# Patient Record
Sex: MALE | Race: WHITE | NOT HISPANIC OR LATINO | Employment: FULL TIME | URBAN - METROPOLITAN AREA
[De-identification: names, ages, dates, MRNs, and addresses within clinical notes are randomized per-mention and may not be internally consistent; named-entity substitution may affect disease eponyms.]

---

## 2023-11-17 NOTE — PROGRESS NOTES
Colorectal Surgery Established Patient Visit    History Of Present Illness  Ross Nova is a 68 y.o. male Ross Nova is a 67M who underwent a J to K POUCH August 2019. He last saw Dr. Ramos May 2022. Admitted for SBO 12/9/22 - 12/17/22. He was last evaluated by NOY Anguiano in January 2023, doing well with no issues intubating on low residual diet.          Past Medical History  He has no past medical history on file.    Surgical History  He has a past surgical history that includes Other surgical history (05/07/2019); Other surgical history (05/07/2019); and Other surgical history (05/12/2019).     Social History  He has no history on file for tobacco use, alcohol use, and drug use.    Family History  No family history on file.     Allergies  Patient has no allergy information on record.    Home Medications  Prior to Admission medications    Not on File         Review of Systems      Imaging:  No results found.       Labs:   Lab Results   Component Value Date    AST 12 12/12/2022    ALT 7 (L) 12/12/2022    ALKPHOS 41 12/12/2022    PROT 5.2 (L) 12/12/2022    ALBUMIN 3.3 (L) 12/15/2022          Physical Exam       Last Recorded Vitals  There were no vitals taken for this visit.      ASSESSMENT AND PLAN: ***        11/17/2023

## 2023-11-21 ENCOUNTER — TELEMEDICINE (OUTPATIENT)
Dept: SURGERY | Facility: CLINIC | Age: 68
End: 2023-11-21
Payer: MEDICARE

## 2024-05-07 ENCOUNTER — OFFICE VISIT (OUTPATIENT)
Dept: SURGERY | Facility: CLINIC | Age: 69
End: 2024-05-07
Payer: MEDICARE

## 2024-05-07 VITALS
HEIGHT: 74 IN | BODY MASS INDEX: 22.59 KG/M2 | DIASTOLIC BLOOD PRESSURE: 79 MMHG | WEIGHT: 176 LBS | HEART RATE: 51 BPM | SYSTOLIC BLOOD PRESSURE: 126 MMHG

## 2024-05-07 DIAGNOSIS — Z01.812 PRE-PROCEDURAL LABORATORY EXAMINATION: ICD-10-CM

## 2024-05-07 DIAGNOSIS — K43.9 VENTRAL HERNIA WITHOUT OBSTRUCTION OR GANGRENE: Primary | ICD-10-CM

## 2024-05-07 PROCEDURE — 99213 OFFICE O/P EST LOW 20 MIN: CPT | Performed by: COLON & RECTAL SURGERY

## 2024-05-07 PROCEDURE — 1159F MED LIST DOCD IN RCRD: CPT | Performed by: COLON & RECTAL SURGERY

## 2024-05-07 RX ORDER — ATORVASTATIN CALCIUM 20 MG/1
20 TABLET, FILM COATED ORAL DAILY
COMMUNITY

## 2024-05-07 RX ORDER — CHOLECALCIFEROL (VITAMIN D3) 50 MCG
2000 TABLET ORAL
COMMUNITY

## 2024-05-07 RX ORDER — FLUTICASONE PROPIONATE 50 MCG
1 SPRAY, SUSPENSION (ML) NASAL DAILY
COMMUNITY

## 2024-05-07 RX ORDER — FINASTERIDE 5 MG/1
TABLET, FILM COATED ORAL
COMMUNITY

## 2024-05-07 NOTE — PROGRESS NOTES
Colorectal Surgery Established Patient Visit    History Of Present Illness  Ross Nova is a 68 y.o. male Ross Nova is a 67M who underwent a J to K POUCH August 2019. He last saw Dr. Ramos May 2022. Admitted for SBO 12/9/22 - 12/17/22. He was last evaluated by NOY Anguiano in January 2023, doing well with no issues intubating on low residual diet.     Presents to clinic with recent episode of PSBO. Was not hospitalized and symptoms have resolved. He is watching his diet, avoiding raw fruit and vegetables. Also having some difficulty with intubation.       Allergies  Patient has no allergy information on record.    Home Medications  Prior to Admission medications    Not on File         Review of Systems    Constitutional: Negative for fever, chills, anorexia, weight loss, malaise   ENMT: Negative for nasal discharge, congestion, ear pain, mouth pain, throat pain  Respiratory: Negative for cough, hemoptysis, wheezing, shortness of breath  Cardiac: Negative for chest pain, dyspnea on exertion, orthopnea, palpitations, syncope  Gastrointestinal: Negative for nausea, vomiting, diarrhea, constipation, abdominal pain  Genitourinary: Negative for discharge, dysuria, flank pain, frequency, hematuria  Musculoskeletal: Negative for decreased ROM, pain, swelling, weakness   Neurological: Negative for dizziness, confusion, headache, seizures, syncope   Psychiatric: Negative for mood changes, anxiety, hallucinations, sleep changes, suicidal ideas  Skin: Negative for mass, pain, itching, rash, ulcer   Endocrine: Negative for heat intolerance, cold intolerance, excessive sweating, polyuria, excess thirst   Hematologic/Lymph: Negative for anemia, bruising, easy bleeding, night sweats, petechiae, history of DVT/PE or cancer   Allergic/Immunologic: Negative for anaphylaxis, itchy/ teary eyes, itching, sneezing, swelling      Physical Exam  Constitutional: Well developed, awake/alert/oriented x3, no distress, alert and  cooperative   Eyes: Sclera anicteric, no conjunctival inflammation, conjugate gaze   ENMT: mucous membranes moist, no apparent injury,   Head/Neck: Neck supple, no apparent injury, trachea midline, no bruits   Respiratory/Thorax: Patent airways, CTAB, normal breath sounds with good chest expansion  Cardiovascular: Regular, rate and rhythm, no murmurs, normal S1 and S2   Gastrointestinal: Nondistended, soft, non-tender, no rebound tenderness or guarding. RLQ continent ileostomy red and moist. Parastomal hernia present.     Pouchoscopy: Moderately tortuous exit conduit. Normal pouch and pouch mucosa. Normal valve, approx 7 cm in length, good fixation to pouch sidewall.    ASSESSMENT AND PLAN: Continent ileostomy with parastomal hernia and difficult intubation. Offered local repair of parastomal hernia. Patient would like to defer until the fall.    Richard Ramos MD      11/17/2023